# Patient Record
Sex: MALE | URBAN - METROPOLITAN AREA
[De-identification: names, ages, dates, MRNs, and addresses within clinical notes are randomized per-mention and may not be internally consistent; named-entity substitution may affect disease eponyms.]

---

## 2017-02-14 ENCOUNTER — OFFICE VISIT (OUTPATIENT)
Dept: FAMILY MEDICINE | Facility: CLINIC | Age: 52
End: 2017-02-14
Payer: COMMERCIAL

## 2017-02-14 VITALS
TEMPERATURE: 97.8 F | WEIGHT: 185 LBS | SYSTOLIC BLOOD PRESSURE: 116 MMHG | HEART RATE: 97 BPM | BODY MASS INDEX: 28.04 KG/M2 | DIASTOLIC BLOOD PRESSURE: 72 MMHG | HEIGHT: 68 IN

## 2017-02-14 DIAGNOSIS — F51.01 PRIMARY INSOMNIA: Primary | ICD-10-CM

## 2017-02-14 PROCEDURE — 99214 OFFICE O/P EST MOD 30 MIN: CPT | Performed by: FAMILY MEDICINE

## 2017-02-14 RX ORDER — ZOLPIDEM TARTRATE 5 MG/1
5-10 TABLET ORAL
Qty: 14 TABLET | Refills: 0 | Status: SHIPPED | OUTPATIENT
Start: 2017-02-14

## 2017-02-14 ASSESSMENT — PAIN SCALES - GENERAL: PAINLEVEL: NO PAIN (0)

## 2017-02-14 NOTE — NURSING NOTE
"Chief Complaint   Patient presents with     Insomnia     x 3 mo       Initial /72  Pulse 97  Temp 97.8  F (36.6  C) (Oral)  Ht 5' 8\" (1.727 m)  Wt 185 lb (83.9 kg)  BMI 28.13 kg/m2 Estimated body mass index is 28.13 kg/(m^2) as calculated from the following:    Height as of this encounter: 5' 8\" (1.727 m).    Weight as of this encounter: 185 lb (83.9 kg).  Medication Reconciliation: complete   Radha WHITLOCK      "

## 2017-02-14 NOTE — MR AVS SNAPSHOT
After Visit Summary   2/14/2017    Dilip Dalton    MRN: [id number]           Patient Information     Date Of Birth          1965        Visit Information        Provider Department      2/14/2017 10:00 AM Kashmir Dey MD [facility]        Today's Diagnoses     Primary insomnia    -  1      Care Instructions      What is Insomnia?  Do you have trouble falling asleep? Do you wake up often during the night? Or, maybe you wake up too early in the morning. You may be suffering from insomnia. Talk to your health care provider if it lasts longer than a few weeks and you feel tired most of the time.    What causes insomnia?  Some common causes of insomnia are:    Medical problems such as pain, depression, medication side effects, or trouble breathing    Circadian rhythm disorder, a shift in the body s normal 24-hour activity cycle    Lifestyle factors such as a changing sleep schedule, lack of exercise, or too much caffeine    Sleep settings such as a poor mattress, noise, or a room that s too hot or too cold    Stress such as problems at work, money worries, or family events  Talk to your health care provider  Describe your sleeping problems to your health care provider. Try to keep a daily sleep diary for a couple weeks. Write down the time you go to bed, the time you wake up, and anything that seems to affect your sleep. Your health care provider can work with you to develop a treatment plan. You may need to learn good sleeping habits and make some lifestyle changes. If you have any medical problems, these may need to be treated first.    9884-3119 [employer]. [address] [place] [address], [place] [address]. All rights reserved. This information is not intended as a substitute for professional medical care. Always follow your healthcare professional's instructions.        Insomnia  Insomnia refers to a difficulty going to sleep or staying asleep, or both.  Insomnia has many causes, including anxiety, stress, depression, chronic pain, sleeping cycles out of balance due to working night shifts or excess napping during the day, and a condition called  sleep apnea . Insomnia can be a side effect from stimulant medicines such as decongestants, asthma inhalers and pills, diet pills, and illegal drugs such as speed, crank, crack, and PCP.  Home Care:  1. Review your medicines with your doctor or pharmacist to find out if they can cause insomnia.  2. Caffeine, smoking and alcohol also affect sleep. Limit your daily use and do not use these before bedtime. Alcohol may make you sleepy at first, but as its effects wear off, you may awaken a few hours later and have trouble returning to sleep.  3. Do not exercise, eat or drink large amounts of liquid within 2 hours of your bedtime.  4. Improve your sleep habits. Have a fixed bed and wake-up time. Try to keep noise, light and heat in your bedroom at a comfortable level. Try using earplugs or eyeshades if needed.   5. Avoid watching TV in bed.  6. If you do not fall asleep within 30 minutes, try to relax by reading or listening to soft music.  7. Limit daytime napping to one 30 minute period, early in the day.  8. Get regular exercise. Find other ways to lessen your stress level.  9. If a medicine was prescribed to help reset your sleep patterns, take it as directed. Sleeping pills are intended for short-term use, only. If taken for too long, the effect wears off while the risk of physical addiction and psychological dependence increases.  Follow-Up  with your doctor or as directed by our staff if you feel that your insomnia is not responding to the above measures.  Get Prompt Medical Attention  if any of the following occur:    Extreme restlessness or irritability    Confusion or hallucinations (seeing or hearing things that are not there)    Anxiety, depression    Several days without sleeping    8105-7126 [employer]  [employer]. [address] [place] [address]. All rights reserved. This information is not intended as a substitute for professional medical care. Always follow your healthcare professional's instructions.        He should use the sleeping pills around 9 PM   He should not use it for more than 2 days in a row.   This is for short term use only.           Follow-ups after your visit        Additional Services     SLEEP EVALUATION & MANAGEMENT REFERRAL - ADULT       Please be aware that coverage of these services is subject to the terms and limitations of your health insurance plan.  Call member services at your health plan with any benefit or coverage questions.      Please bring the following to your appointment:    >>   List of current medications   >>   This referral request   >>   Any documents/labs given to you for this referral                  Follow-up notes from your care team     Return in about 2 weeks (around 2/28/2017), or if symptoms worsen or fail to improve.      Future tests that were ordered for you today     Open Future Orders        Priority Expected Expires Ordered    SLEEP EVALUATION & MANAGEMENT REFERRAL - ADULT Routine  2/14/2018 2/14/2017            Who to contact     If you have questions or need follow up information about today's clinic visit or your schedule please contact [facility] directly at [phone].  Normal or non-critical lab and imaging results will be communicated to you by MyChart, letter or phone within 4 business days after the clinic has received the results. If you do not hear from us within 7 days, please contact the clinic through MyChart or phone. If you have a critical or abnormal lab result, we will notify you by phone as soon as possible.  Submit refill requests through [employer] or call your pharmacy and they will forward the refill request to us. Please allow 3 business days for your refill to be completed.          Additional Information  "About Your Visit        [employer] Information     [employer] lets you send messages to your doctor, view your test results, renew your prescriptions, schedule appointments and more. To sign up, go to [email or web address] . Click on \"Log in\" on the left side of the screen, which will take you to the Welcome page. Then click on \"Sign up Now\" on the right side of the page.     You will be asked to enter the access code listed below, as well as some personal information. Please follow the directions to create your username and password.     Your access code is: [email or web address]  Expires: 5/15/2017 10:38 AM     Your access code will  in 90 days. If you need help or a new code, please call your [facility] clinic or [phone].        Care EveryWhere ID     This is your Care EveryWhere ID. This could be used by other organizations to access your [facility] medical records  [id number]        Your Vitals Were     Pulse Temperature Height BMI (Body Mass Index)          97 97.8  F (36.6  C) (Oral) 5' 8\" (1.727 m) 28.13 kg/m2         Blood Pressure from Last 3 Encounters:   17 116/72    Weight from Last 3 Encounters:   17 185 lb (83.9 kg)                 Today's Medication Changes          These changes are accurate as of: 17 10:41 AM.  If you have any questions, ask your nurse or doctor.               Start taking these medicines.        Dose/Directions    zolpidem 5 MG tablet   Commonly known as:  AMBIEN   Used for:  Primary insomnia   Started by:  Kashmir Dey MD        Dose:  5-10 mg   Take 1-2 tablets (5-10 mg) by mouth nightly as needed for sleep   Quantity:  14 tablet   Refills:  0            Where to get your medicines      Some of these will need a paper prescription and others can be bought over the counter.  Ask your nurse if you have questions.     Bring a paper prescription for each of these medications     zolpidem 5 MG tablet                Primary Care Provider    None Specified       " No primary provider on file.        Thank you!     Thank you for choosing [facility]  for your care. Our goal is always to provide you with excellent care. Hearing back from our patients is one way we can continue to improve our services. Please take a few minutes to complete the written survey that you may receive in the mail after your visit with us. Thank you!             Your Updated Medication List - Protect others around you: Learn how to safely use, store and throw away your medicines at www.disposemymeds.org.          This list is accurate as of: 2/14/17 10:41 AM.  Always use your most recent med list.                   Brand Name Dispense Instructions for use    zolpidem 5 MG tablet    AMBIEN    14 tablet    Take 1-2 tablets (5-10 mg) by mouth nightly as needed for sleep

## 2017-02-14 NOTE — PROGRESS NOTES
"  SUBJECTIVE:                                                    Dilip Dalton is a 51 year old male who presents to clinic today for the following health issues:    Insomnia x 3 mo    Patient did not change shifts   He works construction   He feels like his eyes are big   He tries to sleep   He does not get to sleep til 3 in the ma   He wakes up at 7 am     He has a headache when he goes to bed   It is in the back of the head   Before three months ago he did not have headaches     He does not have to urinate at night     No other pain at night   On days he is not working it is the same     He is trying to get more tired during the day   He works till 3;30-5;30 PM     He is tired during the day time     He does remember anything different when this started     Patient does not use otc meds   Occasionally uses advil   He went to a clinic in [facility]   He thinks it might have been melatonin     Nothing new in life   He is not anxious   He does not do computer or tv before sleep     Coffee in the am   Sometimes he drinks coffee in the evening   He was told to stop doing this   No tea     No neurologic symptoms   He tried to drink to beers and this did not help     Goes to sleep at 9-10 PM   This is not new.     He watches tv 7-8 PM ; before this he would use the tv to go to sleep     He denies apnea   He does snore if he is tired       History reviewed. No pertinent past medical history.    History reviewed. No pertinent past surgical history.    History reviewed. No pertinent family history.    Social History   Substance Use Topics     Smoking status: Never Smoker     Smokeless tobacco: Not on file     Alcohol use Yes      Comment: 6-10 per mo       No current outpatient prescriptions on file prior to visit.  No current facility-administered medications on file prior to visit.       O: /72  Pulse 97  Temp 97.8  F (36.6  C) (Oral)  Ht 5' 8\" (1.727 m)  Wt 185 lb (83.9 kg)  BMI 28.13 kg/m2    Head: " Normocephalic, atraumatic.  Eyes: Conjunctiva clear, non icteric. PERRLA.  Ears: External ears and TMs normal BL.  Nose: Septum midline, nasal mucosa pink and moist. No discharge.  Mouth / Throat: Normal dentition.  No oral lesions. Pharynx non erythematous, tonsils without hypertrophy.  Neck: Supple, no enlarged LN, trachea midline.    Chest wall normal to inspection and palpation. Good excursion bilaterally. Lungs clear to auscultation. Good air movement bilaterally without rales, wheezes, or rhonchi.   Regular rate and  rhythm. S1 and S2 normal, no murmurs, clicks, gallops or rubs. No edema or JVD.    Problem list and histories reviewed & adjusted, as indicated.      ICD-10-CM    1. Primary insomnia F51.01        He should use the sleeping pills around 9 PM   He should not use it for more than 2 days in a row.   This is for short term use only.

## 2017-02-14 NOTE — PATIENT INSTRUCTIONS
What is Insomnia?  Do you have trouble falling asleep? Do you wake up often during the night? Or, maybe you wake up too early in the morning. You may be suffering from insomnia. Talk to your health care provider if it lasts longer than a few weeks and you feel tired most of the time.    What causes insomnia?  Some common causes of insomnia are:    Medical problems such as pain, depression, medication side effects, or trouble breathing    Circadian rhythm disorder, a shift in the body s normal 24-hour activity cycle    Lifestyle factors such as a changing sleep schedule, lack of exercise, or too much caffeine    Sleep settings such as a poor mattress, noise, or a room that s too hot or too cold    Stress such as problems at work, money worries, or family events  Talk to your health care provider  Describe your sleeping problems to your health care provider. Try to keep a daily sleep diary for a couple weeks. Write down the time you go to bed, the time you wake up, and anything that seems to affect your sleep. Your health care provider can work with you to develop a treatment plan. You may need to learn good sleeping habits and make some lifestyle changes. If you have any medical problems, these may need to be treated first.    3032-5032 [employer]. [address] [place] [address], [place] [address]. All rights reserved. This information is not intended as a substitute for professional medical care. Always follow your healthcare professional's instructions.        Insomnia  Insomnia refers to a difficulty going to sleep or staying asleep, or both. Insomnia has many causes, including anxiety, stress, depression, chronic pain, sleeping cycles out of balance due to working night shifts or excess napping during the day, and a condition called  sleep apnea . Insomnia can be a side effect from stimulant medicines such as decongestants, asthma inhalers and pills, diet pills, and illegal drugs such as speed, crank,  crack, and PCP.  Home Care:  1. Review your medicines with your doctor or pharmacist to find out if they can cause insomnia.  2. Caffeine, smoking and alcohol also affect sleep. Limit your daily use and do not use these before bedtime. Alcohol may make you sleepy at first, but as its effects wear off, you may awaken a few hours later and have trouble returning to sleep.  3. Do not exercise, eat or drink large amounts of liquid within 2 hours of your bedtime.  4. Improve your sleep habits. Have a fixed bed and wake-up time. Try to keep noise, light and heat in your bedroom at a comfortable level. Try using earplugs or eyeshades if needed.   5. Avoid watching TV in bed.  6. If you do not fall asleep within 30 minutes, try to relax by reading or listening to soft music.  7. Limit daytime napping to one 30 minute period, early in the day.  8. Get regular exercise. Find other ways to lessen your stress level.  9. If a medicine was prescribed to help reset your sleep patterns, take it as directed. Sleeping pills are intended for short-term use, only. If taken for too long, the effect wears off while the risk of physical addiction and psychological dependence increases.  Follow-Up  with your doctor or as directed by our staff if you feel that your insomnia is not responding to the above measures.  Get Prompt Medical Attention  if any of the following occur:    Extreme restlessness or irritability    Confusion or hallucinations (seeing or hearing things that are not there)    Anxiety, depression    Several days without sleeping    7113-2634 [employer]. [address] [place] [address], [place] [address]. All rights reserved. This information is not intended as a substitute for professional medical care. Always follow your healthcare professional's instructions.        He should use the sleeping pills around 9 PM   He should not use it for more than 2 days in a row.   This is for short term use only.